# Patient Record
Sex: MALE | Race: WHITE | NOT HISPANIC OR LATINO | Employment: FULL TIME | URBAN - METROPOLITAN AREA
[De-identification: names, ages, dates, MRNs, and addresses within clinical notes are randomized per-mention and may not be internally consistent; named-entity substitution may affect disease eponyms.]

---

## 2023-07-08 ENCOUNTER — OFFICE VISIT (OUTPATIENT)
Dept: URGENT CARE | Facility: URGENT CARE | Age: 43
End: 2023-07-08
Payer: COMMERCIAL

## 2023-07-08 VITALS
TEMPERATURE: 98.3 F | RESPIRATION RATE: 14 BRPM | WEIGHT: 206.8 LBS | DIASTOLIC BLOOD PRESSURE: 88 MMHG | OXYGEN SATURATION: 98 % | SYSTOLIC BLOOD PRESSURE: 145 MMHG | HEART RATE: 84 BPM

## 2023-07-08 DIAGNOSIS — R36.9 DISCHARGE FROM PENIS: Primary | ICD-10-CM

## 2023-07-08 DIAGNOSIS — Z72.51 UNPROTECTED SEXUAL INTERCOURSE: ICD-10-CM

## 2023-07-08 DIAGNOSIS — R09.82 POST-NASAL DRIP: ICD-10-CM

## 2023-07-08 LAB
ALBUMIN UR-MCNC: 100 MG/DL
APPEARANCE UR: CLEAR
BACTERIA #/AREA URNS HPF: ABNORMAL /HPF
BILIRUB UR QL STRIP: NEGATIVE
COLOR UR AUTO: YELLOW
GLUCOSE UR STRIP-MCNC: NEGATIVE MG/DL
HGB UR QL STRIP: ABNORMAL
KETONES UR STRIP-MCNC: NEGATIVE MG/DL
LEUKOCYTE ESTERASE UR QL STRIP: NEGATIVE
MUCOUS THREADS #/AREA URNS LPF: PRESENT /LPF
NITRATE UR QL: NEGATIVE
PH UR STRIP: 6 [PH] (ref 5–7)
RBC #/AREA URNS AUTO: ABNORMAL /HPF
SP GR UR STRIP: >=1.03 (ref 1–1.03)
SPERM #/AREA URNS HPF: PRESENT /HPF
T VAGINALIS DNA SPEC QL NAA+PROBE: NOT DETECTED
UROBILINOGEN UR STRIP-ACNC: 0.2 E.U./DL
WBC #/AREA URNS AUTO: ABNORMAL /HPF

## 2023-07-08 PROCEDURE — 87661 TRICHOMONAS VAGINALIS AMPLIF: CPT | Performed by: NURSE PRACTITIONER

## 2023-07-08 PROCEDURE — 36415 COLL VENOUS BLD VENIPUNCTURE: CPT | Performed by: NURSE PRACTITIONER

## 2023-07-08 PROCEDURE — 96372 THER/PROPH/DIAG INJ SC/IM: CPT | Performed by: NURSE PRACTITIONER

## 2023-07-08 PROCEDURE — 87389 HIV-1 AG W/HIV-1&-2 AB AG IA: CPT | Performed by: NURSE PRACTITIONER

## 2023-07-08 PROCEDURE — 87591 N.GONORRHOEAE DNA AMP PROB: CPT | Performed by: NURSE PRACTITIONER

## 2023-07-08 PROCEDURE — 87491 CHLMYD TRACH DNA AMP PROBE: CPT | Performed by: NURSE PRACTITIONER

## 2023-07-08 PROCEDURE — 81001 URINALYSIS AUTO W/SCOPE: CPT | Performed by: NURSE PRACTITIONER

## 2023-07-08 PROCEDURE — 99204 OFFICE O/P NEW MOD 45 MIN: CPT | Mod: 25 | Performed by: NURSE PRACTITIONER

## 2023-07-08 PROCEDURE — 86780 TREPONEMA PALLIDUM: CPT | Performed by: NURSE PRACTITIONER

## 2023-07-08 RX ORDER — CEFTRIAXONE SODIUM 1 G
500 VIAL (EA) INJECTION ONCE
Status: COMPLETED | OUTPATIENT
Start: 2023-07-08 | End: 2023-07-08

## 2023-07-08 RX ORDER — DOXYCYCLINE HYCLATE 100 MG
100 TABLET ORAL 2 TIMES DAILY
Qty: 14 TABLET | Refills: 0 | Status: SHIPPED | OUTPATIENT
Start: 2023-07-08 | End: 2023-07-15

## 2023-07-08 RX ADMIN — Medication 500 MG: at 12:26

## 2023-07-08 ASSESSMENT — PAIN SCALES - GENERAL: PAINLEVEL: NO PAIN (0)

## 2023-07-08 NOTE — PROGRESS NOTES
Assessment & Plan     Discharge from penis    - Chlamydia & Gonorrhea by PCR, [facility] - Clinic Collect  - HIV Antigen Antibody Combo  - Treponema Abs w Reflex to RPR and Titer  - Trichomonas vaginalis DNA PCR  - UA Macroscopic with reflex to Microscopic and Culture - Lab Collect  - Chlamydia & Gonorrhea by PCR, [facility] - Clinic Collect  - HIV Antigen Antibody Combo  - Treponema Abs w Reflex to RPR and Titer  - Trichomonas vaginalis DNA PCR  - UA Macroscopic with reflex to Microscopic and Culture - Lab Collect  - cefTRIAXone (ROCEPHIN) in lidocaine 1% (PF) for IM administration only 500 mg  - doxycycline hyclate (VIBRA-TABS) 100 MG tablet  Dispense: 14 tablet; Refill: 0    Unprotected sexual intercourse    Post-nasal drip       STD testing in process and UA in process, will notify when results available. He is treated empirically with 500 mg IM rocephin and doxycycline twice daily for 7 days, no sun exposure recommended. No sexual contact for at least 2 weeks - until finish treatment, test results available, any partners are tested and treated. Consistent condom use recommended.       COVID testing declined. No sinus infection currently. Discussed symptoms likely viral and antibiotic not indicated. Rest, fluids, nasal saline with neti pot irrigation, flonase nasal spray, continue Claritin, can add Delsym or Robitussin as needed. Cough can linger for weeks.     Follow-up with PCP if symptoms persist for 5 days, and sooner if symptoms worsen or new symptoms develop.     Discussed red flag symptoms which warrant immediate visit in emergency room    All questions were answered and patient verbalized understanding. AVS reviewed with patient.     32 minutes spent during visit, chart review, and charting on day of encounter.    Georgina Oates, DNP, APRN, CNP 7/8/2023 12:39 PM  [facility]          Vinay Santos is a 43 year old male who presents to clinic today for the following  health issues:  Chief Complaint   Patient presents with     concern     Discharge from penis since Tuesday or wed, more frequent, not painful, after urinating feels itchy     Pharyngitis     Possible sinus infection, cough started wed, tried claritin D     Patient presents for evaluation of penis discharge. Symptoms have been present for 4 days and have been stable. He has noticed cloudy discharge from penis after voiding about twice daily and is concerned he has an STD. No history of STD. He has had 2 male sexual partners recently, no condom use, about 2 weeks ago. No IV drug use. Associated symptoms: mild uncomfortable feeling in genitals after voiding. enies dysuria, hematuria, frequency, urgency, genital sores, abdominal pain, flank pain, fever, chills, nausea, emesis, testicular swelling or redness.     Also, he has had a cough for 3 days and wonders if he has a sinus infection. Associated symptoms: mild nasal congestion, post-nasal drip. He had a sore throat which resolved. Denies ear pain, sinus pressure. He has been taking Claritin D which helps temporarily. He tested negative for COVID at home. He does have seasonal allergies.     Problem list, Medication list, Allergies, and Medical history reviewed in EPIC.    ROS:  Review of systems negative except for noted above        Objective    BP (!) 145/88   Pulse 84   Temp 98.3  F (36.8  C) (Tympanic)   Resp 14   Wt 93.8 kg (206 lb 12.8 oz)   SpO2 98%   Physical Exam  Constitutional:       General: He is not in acute distress.     Appearance: He is not toxic-appearing or diaphoretic.   HENT:      Head: Normocephalic and atraumatic.      Right Ear: Tympanic membrane, ear canal and external ear normal.      Left Ear: Tympanic membrane, ear canal and external ear normal.      Nose:      Right Sinus: No maxillary sinus tenderness or frontal sinus tenderness.      Left Sinus: No maxillary sinus tenderness or frontal sinus tenderness.      Comments: Mild nasal  congestion     Mouth/Throat:      Mouth: Mucous membranes are moist.      Pharynx: Oropharynx is clear. No oropharyngeal exudate or posterior oropharyngeal erythema.      Comments: Post-nasal drip  Eyes:      Conjunctiva/sclera: Conjunctivae normal.   Cardiovascular:      Rate and Rhythm: Normal rate and regular rhythm.      Heart sounds: Normal heart sounds.   Pulmonary:      Effort: Pulmonary effort is normal. No respiratory distress.      Breath sounds: Normal breath sounds. No wheezing, rhonchi or rales.   Abdominal:      General: Bowel sounds are normal. There is no distension.      Palpations: Abdomen is soft.      Tenderness: There is no abdominal tenderness. There is no right CVA tenderness, left CVA tenderness, guarding or rebound.   Lymphadenopathy:      Cervical: No cervical adenopathy.   Skin:     General: Skin is warm and dry.   Neurological:      Mental Status: He is alert.          Labs:  Results for orders placed or performed in visit on 07/08/23   UA Macroscopic with reflex to Microscopic and Culture - Lab Collect     Status: Abnormal    Specimen: Urine, Midstream   Result Value Ref Range    Color Urine Yellow Colorless, Straw, Light Yellow, Yellow    Appearance Urine Clear Clear    Glucose Urine Negative Negative mg/dL    Bilirubin Urine Negative Negative    Ketones Urine Negative Negative mg/dL    Specific Gravity Urine >=1.030 1.003 - 1.035    Blood Urine Trace (A) Negative    pH Urine 6.0 5.0 - 7.0    Protein Albumin Urine 100 (A) Negative mg/dL    Urobilinogen Urine 0.2 0.2, 1.0 E.U./dL    Nitrite Urine Negative Negative    Leukocyte Esterase Urine Negative Negative   UA Microscopic with Reflex to Culture     Status: Abnormal   Result Value Ref Range    Bacteria Urine Few (A) None Seen /HPF    RBC Urine 0-2 0-2 /HPF /HPF    WBC Urine 0-5 0-5 /HPF /HPF    Mucus Urine Present (A) None Seen /LPF    Sperm Urine Present (A) None Seen /HPF    Narrative    Urine Culture not indicated

## 2023-07-08 NOTE — PATIENT INSTRUCTIONS
No sexual contact for at least 2 weeks - until finish treatment, test results available, any partners are tested and treated    Nasal saline, flonase nasal spray, neti pot twice daily    No sinus infection currently

## 2023-07-09 LAB
C TRACH DNA SPEC QL PROBE+SIG AMP: NEGATIVE
N GONORRHOEA DNA SPEC QL NAA+PROBE: NEGATIVE
T PALLIDUM AB SER QL: NONREACTIVE

## 2023-07-10 LAB — HIV 1+2 AB+HIV1 P24 AG SERPL QL IA: NONREACTIVE

## 2023-07-10 NOTE — RESULT ENCOUNTER NOTE
Tom  Your results were normal. Please contact me if you have any questions through my-chart     Rachel DARDEN-CNP

## 2023-08-09 ASSESSMENT — ENCOUNTER SYMPTOMS
SORE THROAT: 0
DIARRHEA: 0
PALPITATIONS: 0
EYE PAIN: 0
DYSURIA: 0
SHORTNESS OF BREATH: 0
CHILLS: 0
HEARTBURN: 0
HEMATOCHEZIA: 0
WEAKNESS: 0
HEADACHES: 0
JOINT SWELLING: 0
NERVOUS/ANXIOUS: 0
HEMATURIA: 0
MYALGIAS: 0
ABDOMINAL PAIN: 0
CONSTIPATION: 0
DIZZINESS: 0
PARESTHESIAS: 0
NAUSEA: 0
FEVER: 0
ARTHRALGIAS: 0
COUGH: 0

## 2023-08-10 ENCOUNTER — OFFICE VISIT (OUTPATIENT)
Dept: FAMILY MEDICINE | Facility: CLINIC | Age: 43
End: 2023-08-10
Payer: COMMERCIAL

## 2023-08-10 VITALS
BODY MASS INDEX: 24.32 KG/M2 | RESPIRATION RATE: 16 BRPM | OXYGEN SATURATION: 98 % | HEIGHT: 77 IN | WEIGHT: 206 LBS | DIASTOLIC BLOOD PRESSURE: 68 MMHG | TEMPERATURE: 98.8 F | SYSTOLIC BLOOD PRESSURE: 134 MMHG | HEART RATE: 78 BPM

## 2023-08-10 DIAGNOSIS — Z00.00 HEALTHCARE MAINTENANCE: Primary | ICD-10-CM

## 2023-08-10 DIAGNOSIS — Z11.59 NEED FOR HEPATITIS C SCREENING TEST: ICD-10-CM

## 2023-08-10 DIAGNOSIS — L91.8 SKIN TAG: ICD-10-CM

## 2023-08-10 DIAGNOSIS — Z13.220 LIPID SCREENING: ICD-10-CM

## 2023-08-10 DIAGNOSIS — Z12.83 SCREENING FOR SKIN CANCER: ICD-10-CM

## 2023-08-10 LAB
APO A-I SERPL-MCNC: <6 MG/DL
CHOLEST SERPL-MCNC: 175 MG/DL
HCV AB SERPL QL IA: NONREACTIVE
HDLC SERPL-MCNC: 40 MG/DL
LDLC SERPL CALC-MCNC: 116 MG/DL
NONHDLC SERPL-MCNC: 135 MG/DL
TRIGL SERPL-MCNC: 96 MG/DL

## 2023-08-10 PROCEDURE — 99396 PREV VISIT EST AGE 40-64: CPT | Mod: 25 | Performed by: NURSE PRACTITIONER

## 2023-08-10 PROCEDURE — 88305 TISSUE EXAM BY PATHOLOGIST: CPT | Performed by: PATHOLOGY

## 2023-08-10 PROCEDURE — 83695 ASSAY OF LIPOPROTEIN(A): CPT | Performed by: NURSE PRACTITIONER

## 2023-08-10 PROCEDURE — 36415 COLL VENOUS BLD VENIPUNCTURE: CPT | Performed by: NURSE PRACTITIONER

## 2023-08-10 PROCEDURE — 86803 HEPATITIS C AB TEST: CPT | Performed by: NURSE PRACTITIONER

## 2023-08-10 PROCEDURE — 11200 RMVL SKIN TAGS UP TO&INC 15: CPT | Performed by: NURSE PRACTITIONER

## 2023-08-10 PROCEDURE — 80061 LIPID PANEL: CPT | Performed by: NURSE PRACTITIONER

## 2023-08-10 RX ORDER — LORATADINE 10 MG/1
10 TABLET ORAL DAILY
COMMUNITY

## 2023-08-10 ASSESSMENT — ENCOUNTER SYMPTOMS
HEADACHES: 0
COUGH: 0
HEMATURIA: 0
PALPITATIONS: 0
HEMATOCHEZIA: 0
HEARTBURN: 0
DYSURIA: 0
JOINT SWELLING: 0
FEVER: 0
SHORTNESS OF BREATH: 0
CHILLS: 0
PARESTHESIAS: 0
DIARRHEA: 0
NERVOUS/ANXIOUS: 0
ABDOMINAL PAIN: 0
CONSTIPATION: 0
ARTHRALGIAS: 0
SORE THROAT: 0
WEAKNESS: 0
DIZZINESS: 0
MYALGIAS: 0
EYE PAIN: 0
NAUSEA: 0

## 2023-08-10 ASSESSMENT — PAIN SCALES - GENERAL: PAINLEVEL: NO PAIN (0)

## 2023-08-10 NOTE — ASSESSMENT & PLAN NOTE
Family history  Dad: HTN, Hyperlipidemia  Mom: overweight  MGM: stroke, lung cancer  PGM: CHF  No diabeties, MI, Cancers

## 2023-08-10 NOTE — PROGRESS NOTES
SUBJECTIVE:   CC: Tom is an 43 year old who presents for preventative health visit.       Nutrition: increased green veggies, apples, lemon, meats, no drinking beer  Exercise/movement: hiking 8-10 minute before work; 1-2 weekend  Sleep: 6-7 hours inconsistent,   Stress: not to much manages it well  Alcohol: occasional drink,   Tobacco: none,  Drugs: none    Family history  Dad: HTN, Hyperlipidemia  Mom: overweight  MGM: stroke, lung cancer  PGM: CHF  No diabeties, MI, Cancers      Healthy Habits:     Getting at least 3 servings of Calcium per day:  Yes    Bi-annual eye exam:  Yes    Dental care twice a year:  Yes    Sleep apnea or symptoms of sleep apnea:  None    Diet:  Regular (no restrictions)    Frequency of exercise:  2-3 days/week    Duration of exercise:  30-45 minutes    Taking medications regularly:  Yes    Barriers to taking medications:  None    Medication side effects:  None    Additional concerns today:  Yes        Today's PHQ-2 Score:       8/9/2023    11:22 PM   PHQ-2 ( 1999 [employer])   Q1: Little interest or pleasure in doing things 0   Q2: Feeling down, depressed or hopeless 0   PHQ-2 Score 0   Q1: Little interest or pleasure in doing things Not at all   Q2: Feeling down, depressed or hopeless Not at all   PHQ-2 Score 0     Have you ever done Advance Care Planning? (For example, a Health Directive, POLST, or a discussion with a medical provider or your loved ones about your wishes): No, advance care planning information given to patient to review.  Patient plans to discuss their wishes with loved ones or provider.      Social History     Tobacco Use    Smoking status: Never    Smokeless tobacco: Never   Substance Use Topics    Alcohol use: Not on file           8/9/2023    11:22 PM   Alcohol Use   Prescreen: >3 drinks/day or >7 drinks/week? No       Last PSA: No results found for: PSA    Reviewed orders with patient. Reviewed health maintenance and updated orders accordingly - Yes  Lab work is in  "process    Reviewed and updated as needed this visit by clinical staff                  Reviewed and updated as needed this visit by Provider                 No past medical history on file.   No past surgical history on file.    Review of Systems   Constitutional:  Negative for chills and fever.   HENT:  Negative for congestion, ear pain, hearing loss and sore throat.    Eyes:  Negative for pain and visual disturbance.   Respiratory:  Negative for cough and shortness of breath.    Cardiovascular:  Negative for chest pain, palpitations and peripheral edema.   Gastrointestinal:  Negative for abdominal pain, constipation, diarrhea, heartburn, hematochezia and nausea.   Genitourinary:  Negative for dysuria, hematuria, impotence, penile discharge and urgency.   Musculoskeletal:  Negative for arthralgias, joint swelling and myalgias.   Skin:  Negative for rash.   Neurological:  Negative for dizziness, weakness, headaches and paresthesias.   Psychiatric/Behavioral:  Negative for mood changes. The patient is not nervous/anxious.          OBJECTIVE:   /68 (BP Location: Right arm, Patient Position: Sitting, Cuff Size: Adult Large)   Pulse 78   Temp 98.8  F (37.1  C) (Tympanic)   Resp 16   Ht 1.956 m (6' 5\")   Wt 93.4 kg (206 lb)   SpO2 98%   BMI 24.43 kg/m      Physical Exam  Constitutional:       Appearance: Normal appearance.   HENT:      Head: Normocephalic.      Right Ear: Tympanic membrane, ear canal and external ear normal.      Left Ear: Tympanic membrane, ear canal and external ear normal.      Nose: Nose normal.      Mouth/Throat:      Mouth: Mucous membranes are moist.      Pharynx: Oropharynx is clear.      Tonsils: 2+ on the right. 2+ on the left.   Eyes:      Extraocular Movements: Extraocular movements intact.      Conjunctiva/sclera: Conjunctivae normal.      Pupils: Pupils are equal, round, and reactive to light.   Neck:      Thyroid: No thyroid mass, thyromegaly or thyroid tenderness.      " Trachea: Trachea normal.   Cardiovascular:      Rate and Rhythm: Normal rate and regular rhythm.      Heart sounds: Normal heart sounds.   Pulmonary:      Effort: Pulmonary effort is normal.      Breath sounds: Normal breath sounds.   Abdominal:      General: Abdomen is flat. Bowel sounds are normal.   Musculoskeletal:         General: Normal range of motion.      Cervical back: Normal range of motion.   Lymphadenopathy:      Cervical: No cervical adenopathy.   Skin:     General: Skin is warm and dry.   Neurological:      Mental Status: He is alert and oriented to person, place, and time.   Psychiatric:         Mood and Affect: Mood normal.         Behavior: Behavior normal.         Thought Content: Thought content normal.         Judgment: Judgment normal.         ASSESSMENT/PLAN:   Tom was seen today for physical.    Diagnoses and all orders for this visit:    Healthcare maintenance  Vaccines up to date  Hep C testing  Declines Hep B, Covid    Lipid screening  Never had lipid testing, will test today  Family history  Dad: HTN, Hyperlipidemia  Mom: overweight  MGM: stroke, lung cancer  PGM: CHF  No diabeties, MI, Cancers  -     Cancel: Lipid panel reflex to direct LDL Non-fasting; Future  -     Lipoprotein (a); Future  -     Lipid panel reflex to direct LDL Non-fasting; Future  -     Lipoprotein (a)  -     Lipid panel reflex to direct LDL Non-fasting    Need for hepatitis C screening test  -     Hepatitis C Screen Reflex to HCV RNA Quant and Genotype; Future  -     Hepatitis C Screen Reflex to HCV RNA Quant and Genotype    Screening for skin cancer  -     Adult Dermatology Referral; Future    Skin tag  Cleaned site with Betadine; Injected 1% lidocaine in a bleb under the skin, using a straight scissors, cut the skin tags, placing in formalin. Using a nitrogen stick, stopped the bleeding. Patient tolerated procedure well.   -     Surgical Pathology Exam  -     EXC BENIGN SKIN LESION TRUNK/ARM/LEG <=0.5 CM    Other  orders  -     REVIEW OF HEALTH MAINTENANCE PROTOCOL ORDERS        Patient has been advised of split billing requirements and indicates understanding: Yes      COUNSELING:   Counseled on healthy diet, exercise physical activity, stress management, alcohol usage, and sleep.        He reports that he has never smoked. He has never used smokeless tobacco.        ADELSO Coto CNP [facility]

## 2023-08-14 LAB
PATH REPORT.COMMENTS IMP SPEC: NORMAL
PATH REPORT.COMMENTS IMP SPEC: NORMAL
PATH REPORT.FINAL DX SPEC: NORMAL
PATH REPORT.GROSS SPEC: NORMAL
PATH REPORT.MICROSCOPIC SPEC OTHER STN: NORMAL
PATH REPORT.RELEVANT HX SPEC: NORMAL
PHOTO IMAGE: NORMAL

## 2024-10-06 ENCOUNTER — HEALTH MAINTENANCE LETTER (OUTPATIENT)
Age: 44
End: 2024-10-06